# Patient Record
(demographics unavailable — no encounter records)

---

## 2024-10-08 NOTE — DATA REVIEWED
[de-identified] : In light of the patients current symptoms, Complete audiometry was ordered and completed today. I have interpreted these results and reviewed them in detail with the patient.  Moderate sensorineural hearing loss left ear with poor speech recognition.

## 2024-10-08 NOTE — ASSESSMENT
[FreeTextEntry1] : Significant hearing loss in the left ear.  There is also high-frequency hearing loss in the right.  I have reviewed this with the patient and her mother in detail.  We discussed management options in detail.  We discussed amplification options.  I have referred her for audiology consultation to consider amplification options.  Clinical monitoring recommended.  Preferential school room seating recommended.  Follow-up recommended annually and as needed.  Time based billing: This encounter required more than 30 minutes of time excluding procedures.

## 2024-10-08 NOTE — HISTORY OF PRESENT ILLNESS
[de-identified] : PARMJIT LAND has a history of sudden hearing loss in the left ear in or about 2018.  Treated with IT steroids with partial benefit. \par  Occasional tinnitus since that time.  No vertigo\par  No prior history of infectious ear disease.  No known family history of premature hearing loss.  Birth history and childhood history negative [FreeTextEntry1] : 10/08/2024 Patient returns today c/o SNHL, bilateral ear pain. Intermittent bilateral pain; Achy pain.  States that she was experiencing bilateral ear pain since the summer. Went to PCP for ear pain. Told she had a possible internal ear infection. Prescribed antibiotic ear drops, . Still experiencing pain. No recent audiogram. No perceived hearing changes.

## 2024-10-08 NOTE — PHYSICAL EXAM
[Normal] : parotid gland, submandibular gland and thyroid glands are normal [de-identified] : Tenderness on the right side greater than the left [FreeTextEntry1] : Procedure: Microscopic Ear Exam  Left ear:  Ear canal intact without inflammation or lesion.   Tympanic membrane intact without inflammation.  Right ear:  Ear canal intact without inflammation or lesion.   Tympanic membrane intact without inflammation.